# Patient Record
Sex: FEMALE | Race: WHITE | NOT HISPANIC OR LATINO | Employment: PART TIME | URBAN - METROPOLITAN AREA
[De-identification: names, ages, dates, MRNs, and addresses within clinical notes are randomized per-mention and may not be internally consistent; named-entity substitution may affect disease eponyms.]

---
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
## 2021-08-02 ENCOUNTER — HOSPITAL ENCOUNTER (EMERGENCY)
Facility: HOSPITAL | Age: 21
Discharge: HOME | End: 2021-08-02
Attending: NURSE PRACTITIONER | Admitting: NURSE PRACTITIONER
Payer: COMMERCIAL

## 2021-08-02 VITALS — OXYGEN SATURATION: 99 % | TEMPERATURE: 99.2 F | HEART RATE: 70 BPM | RESPIRATION RATE: 18 BRPM

## 2021-08-02 DIAGNOSIS — Z20.822 EXPOSURE TO COVID-19 VIRUS: Primary | ICD-10-CM

## 2021-08-02 LAB — SARS-COV-2 RNA RESP QL NAA+PROBE: NEGATIVE

## 2021-08-02 PROCEDURE — C9803 HOPD COVID-19 SPEC COLLECT: HCPCS

## 2021-08-02 PROCEDURE — G0463 HOSPITAL OUTPT CLINIC VISIT: HCPCS

## 2021-08-02 PROCEDURE — U0003 INFECTIOUS AGENT DETECTION BY NUCLEIC ACID (DNA OR RNA); SEVERE ACUTE RESPIRATORY SYNDROME CORONAVIRUS 2 (SARS-COV-2) (CORONAVIRUS DISEASE [COVID-19]), AMPLIFIED PROBE TECHNIQUE, MAKING USE OF HIGH THROUGHPUT TECHNOLOGIES AS DESCRIBED BY CMS-2020-01-R: HCPCS | Performed by: NURSE PRACTITIONER

## 2021-08-02 PROCEDURE — 99213 OFFICE O/P EST LOW 20 MIN: CPT | Performed by: NURSE PRACTITIONER

## 2021-08-02 ASSESSMENT — ENCOUNTER SYMPTOMS
DIFFICULTY URINATING: 0
FEVER: 0
SHORTNESS OF BREATH: 1
SINUS PAIN: 0
RHINORRHEA: 0
CHILLS: 0
COUGH: 1
HEADACHES: 0
NAUSEA: 0
SORE THROAT: 1
MYALGIAS: 0
FATIGUE: 1
VOMITING: 0
ARTHRALGIAS: 0
DIARRHEA: 0
ABDOMINAL PAIN: 0
SINUS PRESSURE: 0

## 2021-08-02 NOTE — ED TRIAGE NOTES
Pt presents today with request for a  covid test due to her boyfriend testing positive today. Pt states that the only sx she has is being tired and has a headache. Pt is vaccinated. Sx started earlier today. Pt has not taken any otc meds.

## 2021-08-02 NOTE — DISCHARGE INSTRUCTIONS
(Z20.822) Exposure to COVID-19 virus  (primary encounter diagnosis)  Mild viral symptoms - COVID negative after exposure to boyfriend who tested positive today.   -- Symptomatic treatments recommended.  -Discussed that antibiotics would not help symptoms of viral URI. Education provided on symptoms of secondary bacterial infection such as new fever, chills, rigors, shortness of breath, increased work of breathing, that can occur with viral URI and need for further evaluation, if they occur.   - Ensure you are staying hydrated by drinking plenty of fluids or eating foods such as popsicles, jello, pudding.  - Honey can be soothing for sore throat  - Warm salt water gurgles can help soothe sore throat  - Rest  - Humidifier can help with congestion and help keep mucus membranes such as throat and nose from drying out.  - Sleeping slightly propped up can help with congestion and postnasal drainage that can worsen cough at bedtime.  - As long as you have never been told to take Tylenol and/or Ibuprofen you can use them to manage fever and body aches per package instructions  Make sure you eat when you take ibuprofen to avoid stomach upset.  - OTC cough medications per package instructions to help with cough. Check to see if the cough/cold medication already has acetaminophen (Tylenol) in it. If it does avoid taking additional Tylenol.  - If sudden onset of new fever, worsening symptoms return for further evaluation.  - OTC nasal steroid such as Flonase can help decrease sinus inflammation to help with congestion.  - Education provided on symptoms of post-viral bacterial infections including ear infection and pneumonia. This would require re-evaluation for treatment.      RETURN TO THE ED WITH NEW OR WORSENING SYMPTOMS.      Melina Garcia CNP    Results for orders placed or performed during the hospital encounter of 08/02/21   SARS-COV2 (COVID-19) Virus RT-PCR     Status: Normal    Specimen: Nasopharyngeal; Swab    Result Value Ref Range    SARS CoV2 PCR Negative Negative    Narrative    Testing was performed using the Xpert Xpress SARS-CoV-2 Assay on the   Cepheid Gene-Xpert Instrument Systems. Additional information about   this Emergency Use Authorization (EUA) assay can be found via the Lab   Guide. This test should be ordered for the detection of SARS-CoV-2 in   individuals who meet SARS-CoV-2 clinical and/or epidemiological   criteria. Test performance is unknown in asymptomatic patients. This   test is for in vitro diagnostic use under the FDA EUA for   laboratories certified under CLIA to perform high complexity testing.   This test has not been FDA cleared or approved. A negative result   does not rule out the presence of PCR inhibitors in the specimen or   target RNA in concentration below the limit of detection for the   assay. The possibility of a false negative should be considered if   the patient's recent exposure or clinical presentation suggests   COVID-19. This test was validated by [facility]. This laboratory is certified under the Clinical Laboratory Improve  ment Amendments (CLIA) as qualified to perform high complexity testing.   Symptomatic COVID-19 Virus (Coronavirus) by PCR Nasopharyngeal     Status: Normal    Specimen: Nasopharyngeal; Swab    Narrative    The following orders were created for panel order Symptomatic COVID-19 Virus (Coronavirus) by PCR Nasopharyngeal.  Procedure                               Abnormality         Status                     ---------                               -----------         ------                     SARS-COV2 (COVID-19) Vir...[[id number]]  Normal              Final result                 Please view results for these tests on the individual orders.

## 2021-08-02 NOTE — ED PROVIDER NOTES
History     Chief Complaint   Patient presents with     Covid 19 Testing     SO tested positive     HPI  Anahi Otto is a 20 year old female who presents ambulatory for evaluation of headache, pharyngitis, fatigue, cough, dyspnea which all started today.  Denies fever, changes to taste or smell.   Her boyfriend tested positive for COVID today. She has had COVID vaccination - Moderna x 2.   She has not tried anything for symptoms.       Allergies:  No Known Allergies    Problem List:    Patient Active Problem List    Diagnosis Date Noted     Mild recurrent major depression (H) 12/28/2020     Priority: Medium     Encounter for IUD insertion 08/19/2019     Priority: Medium     Screen for STD (sexually transmitted disease) 03/19/2019     Priority: Medium     Last Assessment & Plan:   Continue yearly gonorrhea and chlamydia swab through age 26.  Discussed safe sex practices, use condoms regularly.       Vulvar pruritus 03/19/2019     Priority: Medium     Last Assessment & Plan:   Normal exam.  Vaginal panel, DNA probe done.       Episodic tension-type headache, not intractable 02/17/2017     Priority: Medium     Elevated IOP, unspecified laterality 10/29/2014     Priority: Medium     Overview:   Likely due to technical factors       Myopia 02/02/2011     Priority: Medium        Past Medical History:    Past Medical History:   Diagnosis Date     Episodic tension-type headache, not intractable 2/17/2017       Past Surgical History:    No past surgical history on file.    Family History:    Family History   Problem Relation Age of Onset     Hypertension Father      Hyperlipidemia Father      Coronary Artery Disease Maternal Grandmother      Diabetes Maternal Grandfather      Other Cancer Paternal Grandmother        Social History:  Marital Status:  Single [1]  Social History     Tobacco Use     Smoking status: Never Smoker     Smokeless tobacco: Never Used     Tobacco comment: Vape   Substance Use Topics     Alcohol  use: No     Drug use: No        Medications:    levonorgestrel (MIRENA) 20 MCG/24HR IUD  albuterol (PROAIR HFA/PROVENTIL HFA/VENTOLIN HFA) 108 (90 Base) MCG/ACT inhaler          Review of Systems   Constitutional: Positive for fatigue. Negative for chills and fever.   HENT: Positive for sore throat. Negative for congestion, ear pain, rhinorrhea, sinus pressure and sinus pain.    Respiratory: Positive for cough and shortness of breath.    Cardiovascular: Negative for chest pain.   Gastrointestinal: Negative for abdominal pain, diarrhea, nausea and vomiting.   Genitourinary: Negative for difficulty urinating.   Musculoskeletal: Negative for arthralgias and myalgias.   Skin: Negative for rash.   Neurological: Negative for headaches.       Physical Exam   Pulse: 70  Temp: 99.2  F (37.3  C)  Resp: 18  SpO2: 99 %      Physical Exam  Constitutional:       General: She is not in acute distress.     Appearance: Normal appearance. She is well-developed. She is not ill-appearing or toxic-appearing.   HENT:      Head: Normocephalic and atraumatic.      Right Ear: Tympanic membrane, ear canal and external ear normal.      Left Ear: Tympanic membrane, ear canal and external ear normal.      Nose: Nose normal.      Mouth/Throat:      Lips: Pink.      Mouth: Mucous membranes are moist.      Pharynx: Oropharynx is clear. Uvula midline.   Eyes:      General: Lids are normal. No scleral icterus.     Conjunctiva/sclera: Conjunctivae normal.   Cardiovascular:      Rate and Rhythm: Normal rate and regular rhythm.      Heart sounds: S1 normal and S2 normal. No murmur heard.   No friction rub. No gallop.    Pulmonary:      Effort: Pulmonary effort is normal.      Breath sounds: Normal breath sounds. No wheezing, rhonchi or rales.   Musculoskeletal:      Cervical back: Neck supple.      Comments: FROM upper and lower extremities   Lymphadenopathy:      Cervical: No cervical adenopathy.   Skin:     General: Skin is warm.      Coloration:  Skin is not pale.      Findings: No erythema or rash.   Neurological:      Mental Status: She is alert and oriented to person, place, and time.      Gait: Gait is intact.   Psychiatric:         Mood and Affect: Mood normal.         Behavior: Behavior is cooperative.         ED Course        Procedures         Results for orders placed or performed during the hospital encounter of 08/02/21 (from the past 24 hour(s))   Symptomatic COVID-19 Virus (Coronavirus) by PCR Nasopharyngeal    Specimen: Nasopharyngeal; Swab    Narrative    The following orders were created for panel order Symptomatic COVID-19 Virus (Coronavirus) by PCR Nasopharyngeal.  Procedure                               Abnormality         Status                     ---------                               -----------         ------                     SARS-COV2 (COVID-19) Vir...[[id number]]  Normal              Final result                 Please view results for these tests on the individual orders.   SARS-COV2 (COVID-19) Virus RT-PCR    Specimen: Nasopharyngeal; Swab   Result Value Ref Range    SARS CoV2 PCR Negative Negative    Narrative    Testing was performed using the Xpert Xpress SARS-CoV-2 Assay on the   Cepheid Gene-Xpert Instrument Systems. Additional information about   this Emergency Use Authorization (EUA) assay can be found via the Lab   Guide. This test should be ordered for the detection of SARS-CoV-2 in   individuals who meet SARS-CoV-2 clinical and/or epidemiological   criteria. Test performance is unknown in asymptomatic patients. This   test is for in vitro diagnostic use under the FDA EUA for   laboratories certified under CLIA to perform high complexity testing.   This test has not been FDA cleared or approved. A negative result   does not rule out the presence of PCR inhibitors in the specimen or   target RNA in concentration below the limit of detection for the   assay. The possibility of a false negative should be considered if    the patient's recent exposure or clinical presentation suggests   COVID-19. This test was validated by [facility] laboratory. This laboratory is certified under the Clinical Laboratory Improve  ment Amendments (CLIA) as qualified to perform high complexity testing.       Medications - No data to display    Assessments & Plan (with Medical Decision Making)     I have reviewed the nursing notes.    I have reviewed the findings, diagnosis, plan and need for follow up with the patient.  (Z20.822) Exposure to COVID-19 virus  (primary encounter diagnosis)  Mild viral symptoms - COVID negative after exposure to boyfriend who tested positive today.   -- Symptomatic treatments recommended.      RETURN TO THE ED WITH NEW OR WORSENING SYMPTOMS.      Melina Garcia CNP    Discharge Medication List as of 8/2/2021  4:50 PM          Final diagnoses:   Exposure to COVID-19 virus       8/2/2021   [facility]     Melina Garcia CNP  08/02/21 [id number]

## 2021-08-02 NOTE — Clinical Note
Anahi Otto was seen and treated in our emergency department on 8/2/2021.  She may return to work on 08/03/2021.  COVID negative. Follow work-place guidelines on vaccinated employees and COVID exposure.    Melina Garcia CNP on 8/2/2021 at 4:51 PM       If you have any questions or concerns, please don't hesitate to call.      Melina Garcia, CNP

## 2022-07-16 ENCOUNTER — TRANSFERRED RECORDS (OUTPATIENT)
Dept: HEALTH INFORMATION MANAGEMENT | Facility: CLINIC | Age: 22
End: 2022-07-16

## 2022-07-16 LAB
CREATININE (EXTERNAL): 0.72 MG/DL (ref 0.4–1)
GFR ESTIMATED (EXTERNAL): >60 ML/MIN/1.73M2
GLUCOSE (EXTERNAL): 96 MG/DL (ref 70–99)
POTASSIUM (EXTERNAL): 3.3 MEQ/L (ref 3.4–5.1)
TSH SERPL-ACNC: 1.56 UIU/ML (ref 0.4–3.99)

## 2023-05-02 ENCOUNTER — OFFICE VISIT (OUTPATIENT)
Dept: FAMILY MEDICINE | Facility: OTHER | Age: 23
End: 2023-05-02
Attending: NURSE PRACTITIONER
Payer: COMMERCIAL

## 2023-05-02 VITALS
WEIGHT: 144.8 LBS | TEMPERATURE: 98 F | RESPIRATION RATE: 16 BRPM | OXYGEN SATURATION: 98 % | BODY MASS INDEX: 24.1 KG/M2 | HEART RATE: 76 BPM | DIASTOLIC BLOOD PRESSURE: 64 MMHG | SYSTOLIC BLOOD PRESSURE: 102 MMHG

## 2023-05-02 DIAGNOSIS — F41.1 GAD (GENERALIZED ANXIETY DISORDER): ICD-10-CM

## 2023-05-02 DIAGNOSIS — Z11.3 SCREEN FOR STD (SEXUALLY TRANSMITTED DISEASE): Primary | ICD-10-CM

## 2023-05-02 DIAGNOSIS — F33.0 MDD (MAJOR DEPRESSIVE DISORDER), RECURRENT EPISODE, MILD (H): ICD-10-CM

## 2023-05-02 PROBLEM — F60.9 CLUSTER B PERSONALITY DISORDER IN ADULT (H): Status: RESOLVED | Noted: 2022-07-26 | Resolved: 2023-05-02

## 2023-05-02 PROBLEM — L29.2 VULVAR PRURITUS: Status: RESOLVED | Noted: 2019-03-19 | Resolved: 2023-05-02

## 2023-05-02 PROBLEM — F60.9 CLUSTER B PERSONALITY DISORDER IN ADULT (H): Status: ACTIVE | Noted: 2022-07-26

## 2023-05-02 LAB
ALBUMIN UR-MCNC: NEGATIVE MG/DL
APPEARANCE UR: CLEAR
BACTERIA #/AREA URNS HPF: ABNORMAL /HPF
BILIRUB UR QL STRIP: NEGATIVE
CLUE CELLS: ABNORMAL
COLOR UR AUTO: YELLOW
GLUCOSE UR STRIP-MCNC: NEGATIVE MG/DL
HGB UR QL STRIP: ABNORMAL
HOLD SPECIMEN: NORMAL
HOLD SPECIMEN: NORMAL
KETONES UR STRIP-MCNC: NEGATIVE MG/DL
LEUKOCYTE ESTERASE UR QL STRIP: ABNORMAL
NITRATE UR QL: NEGATIVE
PH UR STRIP: 6 [PH] (ref 5–7)
RBC #/AREA URNS AUTO: ABNORMAL /HPF
SP GR UR STRIP: 1.02 (ref 1–1.03)
SQUAMOUS #/AREA URNS AUTO: ABNORMAL /LPF
TRICHOMONAS, WET PREP: ABNORMAL
UROBILINOGEN UR STRIP-ACNC: 0.2 E.U./DL
WBC #/AREA URNS AUTO: ABNORMAL /HPF
WBC'S/HIGH POWER FIELD, WET PREP: ABNORMAL
YEAST, WET PREP: ABNORMAL

## 2023-05-02 PROCEDURE — 36415 COLL VENOUS BLD VENIPUNCTURE: CPT | Performed by: NURSE PRACTITIONER

## 2023-05-02 PROCEDURE — 99214 OFFICE O/P EST MOD 30 MIN: CPT | Performed by: NURSE PRACTITIONER

## 2023-05-02 PROCEDURE — 81001 URINALYSIS AUTO W/SCOPE: CPT | Performed by: NURSE PRACTITIONER

## 2023-05-02 PROCEDURE — 87591 N.GONORRHOEAE DNA AMP PROB: CPT | Performed by: NURSE PRACTITIONER

## 2023-05-02 PROCEDURE — 87389 HIV-1 AG W/HIV-1&-2 AB AG IA: CPT | Performed by: NURSE PRACTITIONER

## 2023-05-02 PROCEDURE — 87491 CHLMYD TRACH DNA AMP PROBE: CPT | Performed by: NURSE PRACTITIONER

## 2023-05-02 PROCEDURE — 87210 SMEAR WET MOUNT SALINE/INK: CPT | Performed by: NURSE PRACTITIONER

## 2023-05-02 ASSESSMENT — PATIENT HEALTH QUESTIONNAIRE - PHQ9: SUM OF ALL RESPONSES TO PHQ QUESTIONS 1-9: 4

## 2023-05-02 ASSESSMENT — PAIN SCALES - GENERAL: PAINLEVEL: SEVERE PAIN (6)

## 2023-05-02 NOTE — PATIENT INSTRUCTIONS
Assessment & Plan          Screen for STD (sexually transmitted disease)  - Chlamydia trachomatis/Neisseria gonorrhoeae by PCR; Future  - HIV Antigen Antibody Combo; Future  - VDRL (Syphilis) (OB External Result)  - Wet prep  - UA Macroscopic with reflex to Microscopic and Culture; Future  - UA Macroscopic with reflex to Microscopic and Culture  - Chlamydia trachomatis/Neisseria gonorrhoeae by PCR  - HIV Antigen Antibody Combo  - UA Microscopic with Reflex to Culture      MDD (major depressive disorder), recurrent episode, mild (H)  - Follow-up as needed      VONDA (generalized anxiety disorder)  - Follow-up as needed        Marina Jones CNP  [facility]

## 2023-05-02 NOTE — PROGRESS NOTES
Assessment & Plan          Screen for STD (sexually transmitted disease)  - Chlamydia trachomatis/Neisseria gonorrhoeae by PCR; Future  - HIV Antigen Antibody Combo; Future  - VDRL (Syphilis) (OB External Result)  - Wet prep  - UA Macroscopic with reflex to Microscopic and Culture; Future  - UA Macroscopic with reflex to Microscopic and Culture  - Chlamydia trachomatis/Neisseria gonorrhoeae by PCR  - HIV Antigen Antibody Combo  - UA Microscopic with Reflex to Culture      MDD (major depressive disorder), recurrent episode, mild (H)  - Follow-up as needed      VONDA (generalized anxiety disorder)  - Follow-up as needed        Marina Jones CNP  [facility] - [place] is a 22 year old, presenting for the following health issues:  std testing        Vaginal Symptoms  Onset/Duration: 3 weeks  Description:  Vaginal Discharge: yellow/greenish   Itching (Pruritis): No  Burning sensation:  No  Odor: YES  Accompanying Signs & Symptoms:  Urinary symptoms: YES  Abdominal pain: YES  Fever: No  History:   Sexually active: YES  New Partner: No  Possibility of Pregnancy:  No  Recent antibiotic use: No  Previous vaginitis issues: YES  Precipitating or alleviating factors: None  Therapies tried and outcome: none          Depression and Anxiety Follow-Up    How are you doing with your depression since your last visit? Improved     How are you doing with your anxiety since your last visit?  No change    Are you having other symptoms that might be associated with depression or anxiety? No    Have you had a significant life event? No     Do you have any concerns with your use of alcohol or other drugs? No        Social History     Tobacco Use     Smoking status: Never     Smokeless tobacco: Never     Tobacco comments:     Vape   Substance Use Topics     Alcohol use: No     Drug use: No               2/17/2017     2:38 PM 11/24/2020     1:00 PM 5/2/2023     2:53 PM   PHQ   PHQ-9 Total Score 11 17 4   Q9: Thoughts  of better off dead/self-harm past 2 weeks Not at all More than half the days Not at all                2/17/2017     2:38 PM   VONDA-7 SCORE   Total Score 11           Patient Active Problem List   Diagnosis     Episodic tension-type headache, not intractable     Encounter for IUD insertion     Myopia     Elevated IOP, unspecified laterality     MDD (major depressive disorder), recurrent episode, mild (H)     VONDA (generalized anxiety disorder)     No past surgical history on file.    Social History     Tobacco Use     Smoking status: Never     Smokeless tobacco: Never     Tobacco comments:     Vape   Vaping Use     Vaping status: Not on file   Substance Use Topics     Alcohol use: No     Family History   Problem Relation Age of Onset     Hypertension Father      Hyperlipidemia Father      Coronary Artery Disease Maternal Grandmother      Diabetes Maternal Grandfather      Other Cancer Paternal Grandmother                Current Outpatient Medications   Medication Sig Dispense Refill     albuterol (PROAIR HFA/PROVENTIL HFA/VENTOLIN HFA) 108 (90 Base) MCG/ACT inhaler Inhale 2 puffs into the lungs       levonorgestrel (MIRENA) 20 MCG/24HR IUD 1 Intra Uterine Device by Intrauterine route           No Known Allergies       No lab results found.         BP Readings from Last 3 Encounters:   05/02/23 102/64   11/24/20 104/62   07/26/18 104/62 (31 %, Z = -0.50 /  36 %, Z = -0.36)*     *BP percentiles are based on the 2017 AAP Clinical Practice Guideline for girls    Wt Readings from Last 3 Encounters:   05/02/23 65.7 kg (144 lb 12.8 oz)   11/24/20 60.3 kg (132 lb 14.4 oz) (62 %, Z= 0.31)*   07/26/18 59.4 kg (131 lb) (68 %, Z= 0.47)*     * Growth percentiles are based on CDC (Girls, 2-20 Years) data.                 Review of Systems   Constitutional, HEENT, cardiovascular, pulmonary, gi and gu systems are negative, except as otherwise noted.            Objective    /64 (BP Location: Left arm, Patient Position: Sitting,  Cuff Size: Adult Regular)   Pulse 76   Temp 98  F (36.7  C) (Tympanic)   Resp 16   Wt 65.7 kg (144 lb 12.8 oz)   SpO2 98%   BMI 24.10 kg/m    Body mass index is 24.1 kg/m .           Physical Exam   GENERAL: healthy, alert and no distress  NECK: no adenopathy, no asymmetry, masses, or scars and thyroid normal to palpation  RESP: lungs clear to auscultation - no rales, rhonchi or wheezes  CV: regular rate and rhythm, normal S1 S2, no S3 or S4, no murmur, click or rub, no peripheral edema and peripheral pulses strong  MS: no gross musculoskeletal defects noted, no edema  SKIN: no suspicious lesions or rashes  PSYCH: mentation appears normal, affect normal/bright

## 2023-05-03 LAB
C TRACH DNA SPEC QL PROBE+SIG AMP: NEGATIVE
N GONORRHOEA DNA SPEC QL NAA+PROBE: NEGATIVE

## 2023-05-03 NOTE — RESULT ENCOUNTER NOTE
Negative GC, normal wet prep, normal UA  Two tests pending (syphilis and HIV)    [facility]  [phone]

## 2023-05-04 LAB — HIV 1+2 AB+HIV1 P24 AG SERPL QL IA: NONREACTIVE

## 2023-07-31 NOTE — PROGRESS NOTES
Assessment & Plan         Cervical cancer screening  - A pap thin layer screen only - recommended age 21 - 24 years      Chronic cough  - albuterol (PROAIR HFA/PROVENTIL HFA/VENTOLIN HFA) 108 (90 Base) MCG/ACT inhaler; Inhale 2 puffs into the lungs every 6 hours as needed for shortness of breath, wheezing or cough      Nicotine dependence, uncomplicated, unspecified nicotine product type  - Nicotine gum discussed        Return in about 4 months (around 12/2/2023).        Marina Jones CNP  [facility] [place] is a 22 year old, presenting for the following health issues:  Gyn Exam           Pap due  No dysmenorrhea, no dyspareunia.  No concerns for STD's.  Does regular SBE.   No history of abnormal pap.        Acute Illness  Acute illness concerns: cough  Onset/Duration: 1 month  Symptoms:  Fever: No  Chills/Sweats: No  Headache (location?): yes  Sinus Pressure: No  Conjunctivitis:  No  Ear Pain: no  Rhinorrhea: No  Congestion: No  Sore Throat: No  Cough: YES-productive of clear sputum  Wheeze: YES  Decreased Appetite: No  Nausea: No  Vomiting: No  Diarrhea: No  Dysuria/Freq.: No  Dysuria or Hematuria: No  Fatigue/Achiness: No  Sick/Strep Exposure: No  Therapies tried and outcome: None        Patient Active Problem List   Diagnosis    Episodic tension-type headache, not intractable    Encounter for IUD insertion    Myopia    Elevated IOP, unspecified laterality    MDD (major depressive disorder), recurrent episode, mild (H)    VONDA (generalized anxiety disorder)     No past surgical history on file.    Social History     Tobacco Use    Smoking status: Never    Smokeless tobacco: Never    Tobacco comments:     Vape   Substance Use Topics    Alcohol use: No     Family History   Problem Relation Age of Onset    Hypertension Father     Hyperlipidemia Father     Coronary Artery Disease Maternal Grandmother     Diabetes Maternal Grandfather     Other Cancer Paternal Grandmother             Current Outpatient Medications   Medication Sig Dispense Refill    albuterol (PROAIR HFA/PROVENTIL HFA/VENTOLIN HFA) 108 (90 Base) MCG/ACT inhaler Inhale 2 puffs into the lungs every 6 hours as needed for shortness of breath, wheezing or cough 18 g 1    levonorgestrel (MIRENA) 20 MCG/24HR IUD 1 Intra Uterine Device by Intrauterine route           No Known Allergies      No lab results found.       BP Readings from Last 3 Encounters:   08/02/23 120/72   05/02/23 102/64   11/24/20 104/62    Wt Readings from Last 3 Encounters:   08/02/23 69 kg (152 lb 1.6 oz)   05/02/23 65.7 kg (144 lb 12.8 oz)   11/24/20 60.3 kg (132 lb 14.4 oz) (62 %, Z= 0.31)*     * Growth percentiles are based on [facility] (Girls, 2-20 Years) data.                   Review of Systems   Constitutional, HEENT, cardiovascular, pulmonary, gi and gu systems are negative, except as otherwise noted.          Objective    /72 (BP Location: Left arm, Patient Position: Sitting, Cuff Size: Adult Regular)   Pulse 81   Temp 97.9  F (36.6  C) (Tympanic)   Resp 18   Wt 69 kg (152 lb 1.6 oz)   SpO2 95%   BMI 25.31 kg/m    Body mass index is 25.31 kg/m .        Physical Exam   GENERAL: healthy, alert and no distress  EYES: Eyes grossly normal to inspection, PERRL and conjunctivae and sclerae normal  HENT: ear canals and TM's normal, nose and mouth without ulcers or lesions  NECK: no adenopathy, no asymmetry, masses, or scars and thyroid normal to palpation  RESP: lungs clear to auscultation - no rales, rhonchi or wheezes  CV: regular rate and rhythm, normal S1 S2, no S3 or S4, no murmur, click or rub, no peripheral edema and peripheral pulses strong   (female): normal female external genitalia, normal urethral meatus, vaginal mucosa, normal cervix/adnexa/uterus without masses or discharge  SKIN: no suspicious lesions or rashes  PSYCH: mentation appears normal, affect normal/bright        Patient is seen in conjunction with NP student.  History is  reviewed with patient and pertinent portions of the exam are repeated.  Assessment and plan is reviewed with the patient.

## 2023-08-02 ENCOUNTER — OFFICE VISIT (OUTPATIENT)
Dept: FAMILY MEDICINE | Facility: OTHER | Age: 23
End: 2023-08-02
Attending: NURSE PRACTITIONER
Payer: COMMERCIAL

## 2023-08-02 VITALS
BODY MASS INDEX: 25.31 KG/M2 | OXYGEN SATURATION: 95 % | HEART RATE: 81 BPM | TEMPERATURE: 97.9 F | SYSTOLIC BLOOD PRESSURE: 120 MMHG | DIASTOLIC BLOOD PRESSURE: 72 MMHG | WEIGHT: 152.1 LBS | RESPIRATION RATE: 18 BRPM

## 2023-08-02 DIAGNOSIS — F17.200 NICOTINE DEPENDENCE, UNCOMPLICATED, UNSPECIFIED NICOTINE PRODUCT TYPE: ICD-10-CM

## 2023-08-02 DIAGNOSIS — Z12.4 CERVICAL CANCER SCREENING: Primary | ICD-10-CM

## 2023-08-02 DIAGNOSIS — R05.3 CHRONIC COUGH: ICD-10-CM

## 2023-08-02 PROCEDURE — 99213 OFFICE O/P EST LOW 20 MIN: CPT | Mod: 25 | Performed by: NURSE PRACTITIONER

## 2023-08-02 PROCEDURE — G0124 SCREEN C/V THIN LAYER BY MD: HCPCS | Performed by: PATHOLOGY

## 2023-08-02 PROCEDURE — 90471 IMMUNIZATION ADMIN: CPT | Performed by: NURSE PRACTITIONER

## 2023-08-02 PROCEDURE — 90715 TDAP VACCINE 7 YRS/> IM: CPT | Performed by: NURSE PRACTITIONER

## 2023-08-02 PROCEDURE — G0123 SCREEN CERV/VAG THIN LAYER: HCPCS | Performed by: NURSE PRACTITIONER

## 2023-08-02 RX ORDER — ALBUTEROL SULFATE 90 UG/1
2 AEROSOL, METERED RESPIRATORY (INHALATION) EVERY 6 HOURS PRN
Qty: 18 G | Refills: 1 | Status: SHIPPED | OUTPATIENT
Start: 2023-08-02 | End: 2024-08-06

## 2023-08-02 ASSESSMENT — PAIN SCALES - GENERAL: PAINLEVEL: NO PAIN (0)

## 2023-08-02 NOTE — PATIENT INSTRUCTIONS
Assessment & Plan         Cervical cancer screening  - A pap thin layer screen only - recommended age 21 - 24 years      Chronic cough  - albuterol (PROAIR HFA/PROVENTIL HFA/VENTOLIN HFA) 108 (90 Base) MCG/ACT inhaler; Inhale 2 puffs into the lungs every 6 hours as needed for shortness of breath, wheezing or cough      Nicotine dependence, uncomplicated, unspecified nicotine product type  - Nicotine gum discussed          Return in about 4 months (around 12/2/2023).          Marina Jones CNP  [facility]

## 2023-08-11 LAB
BKR LAB AP GYN ADEQUACY: ABNORMAL
BKR LAB AP GYN INTERPRETATION: ABNORMAL
BKR LAB AP GYN OTHER FINDINGS: ABNORMAL
BKR LAB AP HPV REFLEX: NO
BKR LAB AP PREVIOUS ABNORMAL: ABNORMAL
PATH REPORT.COMMENTS IMP SPEC: ABNORMAL
PATH REPORT.COMMENTS IMP SPEC: ABNORMAL
PATH REPORT.RELEVANT HX SPEC: ABNORMAL

## 2023-08-14 DIAGNOSIS — B37.31 YEAST INFECTION OF THE VAGINA: Primary | ICD-10-CM

## 2023-08-14 RX ORDER — FLUCONAZOLE 150 MG/1
150 TABLET ORAL ONCE
Qty: 1 TABLET | Refills: 0 | Status: SHIPPED | OUTPATIENT
Start: 2023-08-14 | End: 2023-08-14

## 2023-10-07 ENCOUNTER — HEALTH MAINTENANCE LETTER (OUTPATIENT)
Age: 23
End: 2023-10-07

## 2024-04-10 ENCOUNTER — OFFICE VISIT (OUTPATIENT)
Dept: FAMILY MEDICINE | Facility: OTHER | Age: 24
End: 2024-04-10
Attending: NURSE PRACTITIONER
Payer: COMMERCIAL

## 2024-04-10 VITALS
DIASTOLIC BLOOD PRESSURE: 70 MMHG | BODY MASS INDEX: 24.18 KG/M2 | HEART RATE: 74 BPM | TEMPERATURE: 98.4 F | WEIGHT: 145.3 LBS | SYSTOLIC BLOOD PRESSURE: 114 MMHG | RESPIRATION RATE: 18 BRPM | OXYGEN SATURATION: 96 %

## 2024-04-10 DIAGNOSIS — B96.89 BACTERIAL VAGINOSIS: ICD-10-CM

## 2024-04-10 DIAGNOSIS — F33.0 MDD (MAJOR DEPRESSIVE DISORDER), RECURRENT EPISODE, MILD (H): ICD-10-CM

## 2024-04-10 DIAGNOSIS — Z20.2 POSSIBLE EXPOSURE TO STD: Primary | ICD-10-CM

## 2024-04-10 DIAGNOSIS — N30.00 ACUTE CYSTITIS WITHOUT HEMATURIA: ICD-10-CM

## 2024-04-10 DIAGNOSIS — N76.0 BACTERIAL VAGINOSIS: ICD-10-CM

## 2024-04-10 DIAGNOSIS — F41.1 GAD (GENERALIZED ANXIETY DISORDER): ICD-10-CM

## 2024-04-10 LAB
ALBUMIN UR-MCNC: 100 MG/DL
APPEARANCE UR: CLEAR
BACTERIA #/AREA URNS HPF: ABNORMAL /HPF
BILIRUB UR QL STRIP: NEGATIVE
CLUE CELLS: PRESENT
COLOR UR AUTO: YELLOW
GLUCOSE UR STRIP-MCNC: NEGATIVE MG/DL
HGB UR QL STRIP: ABNORMAL
KETONES UR STRIP-MCNC: NEGATIVE MG/DL
LEUKOCYTE ESTERASE UR QL STRIP: ABNORMAL
NITRATE UR QL: NEGATIVE
PH UR STRIP: 5.5 [PH] (ref 5–7)
RBC #/AREA URNS AUTO: ABNORMAL /HPF
SP GR UR STRIP: >=1.03 (ref 1–1.03)
SQUAMOUS #/AREA URNS AUTO: ABNORMAL /LPF
TRICHOMONAS, WET PREP: ABNORMAL
UROBILINOGEN UR STRIP-ACNC: 0.2 E.U./DL
WBC #/AREA URNS AUTO: ABNORMAL /HPF
WBC'S/HIGH POWER FIELD, WET PREP: ABNORMAL
YEAST, WET PREP: ABNORMAL

## 2024-04-10 PROCEDURE — 87086 URINE CULTURE/COLONY COUNT: CPT | Performed by: NURSE PRACTITIONER

## 2024-04-10 PROCEDURE — 87491 CHLMYD TRACH DNA AMP PROBE: CPT | Performed by: NURSE PRACTITIONER

## 2024-04-10 PROCEDURE — 87591 N.GONORRHOEAE DNA AMP PROB: CPT | Performed by: NURSE PRACTITIONER

## 2024-04-10 PROCEDURE — 81001 URINALYSIS AUTO W/SCOPE: CPT | Performed by: NURSE PRACTITIONER

## 2024-04-10 PROCEDURE — 86780 TREPONEMA PALLIDUM: CPT | Performed by: NURSE PRACTITIONER

## 2024-04-10 PROCEDURE — 87210 SMEAR WET MOUNT SALINE/INK: CPT | Performed by: NURSE PRACTITIONER

## 2024-04-10 PROCEDURE — 36415 COLL VENOUS BLD VENIPUNCTURE: CPT | Performed by: NURSE PRACTITIONER

## 2024-04-10 PROCEDURE — 87389 HIV-1 AG W/HIV-1&-2 AB AG IA: CPT | Performed by: NURSE PRACTITIONER

## 2024-04-10 PROCEDURE — 99214 OFFICE O/P EST MOD 30 MIN: CPT | Performed by: NURSE PRACTITIONER

## 2024-04-10 PROCEDURE — G2211 COMPLEX E/M VISIT ADD ON: HCPCS | Performed by: NURSE PRACTITIONER

## 2024-04-10 RX ORDER — METRONIDAZOLE 7.5 MG/G
1 GEL VAGINAL AT BEDTIME
Qty: 70 G | Refills: 0 | Status: SHIPPED | OUTPATIENT
Start: 2024-04-10 | End: 2024-04-15

## 2024-04-10 RX ORDER — SULFAMETHOXAZOLE/TRIMETHOPRIM 800-160 MG
1 TABLET ORAL 2 TIMES DAILY
Qty: 14 TABLET | Refills: 0 | Status: SHIPPED | OUTPATIENT
Start: 2024-04-10 | End: 2024-04-17

## 2024-04-10 ASSESSMENT — ANXIETY QUESTIONNAIRES
8. IF YOU CHECKED OFF ANY PROBLEMS, HOW DIFFICULT HAVE THESE MADE IT FOR YOU TO DO YOUR WORK, TAKE CARE OF THINGS AT HOME, OR GET ALONG WITH OTHER PEOPLE?: NOT DIFFICULT AT ALL
6. BECOMING EASILY ANNOYED OR IRRITABLE: NOT AT ALL
5. BEING SO RESTLESS THAT IT IS HARD TO SIT STILL: NOT AT ALL
IF YOU CHECKED OFF ANY PROBLEMS ON THIS QUESTIONNAIRE, HOW DIFFICULT HAVE THESE PROBLEMS MADE IT FOR YOU TO DO YOUR WORK, TAKE CARE OF THINGS AT HOME, OR GET ALONG WITH OTHER PEOPLE: NOT DIFFICULT AT ALL
3. WORRYING TOO MUCH ABOUT DIFFERENT THINGS: NOT AT ALL
7. FEELING AFRAID AS IF SOMETHING AWFUL MIGHT HAPPEN: SEVERAL DAYS
1. FEELING NERVOUS, ANXIOUS, OR ON EDGE: NOT AT ALL
GAD7 TOTAL SCORE: 1
4. TROUBLE RELAXING: NOT AT ALL
7. FEELING AFRAID AS IF SOMETHING AWFUL MIGHT HAPPEN: SEVERAL DAYS
2. NOT BEING ABLE TO STOP OR CONTROL WORRYING: NOT AT ALL

## 2024-04-10 ASSESSMENT — PATIENT HEALTH QUESTIONNAIRE - PHQ9
SUM OF ALL RESPONSES TO PHQ QUESTIONS 1-9: 7
SUM OF ALL RESPONSES TO PHQ QUESTIONS 1-9: 7
10. IF YOU CHECKED OFF ANY PROBLEMS, HOW DIFFICULT HAVE THESE PROBLEMS MADE IT FOR YOU TO DO YOUR WORK, TAKE CARE OF THINGS AT HOME, OR GET ALONG WITH OTHER PEOPLE: NOT DIFFICULT AT ALL

## 2024-04-10 ASSESSMENT — PAIN SCALES - GENERAL: PAINLEVEL: MODERATE PAIN (4)
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
## 2024-04-10 NOTE — PATIENT INSTRUCTIONS
Assessment & Plan         Possible exposure to STD  - GC/Chlamydia by PCR - [facility]  - UA Macroscopic with reflex to Microscopic and Culture  - HIV Antigen Antibody Combo  - Treponema Abs w Reflex to RPR and Titer  - Urine Microscopic Exam  - Urine Culture      Acute cystitis without hematuria  - sulfamethoxazole-trimethoprim (BACTRIM DS) 800-160 MG tablet; Take 1 tablet by mouth 2 times daily for 7 days      Bacterial vaginosis  - metroNIDAZOLE (METROGEL) 0.75 % vaginal gel; Place 1 applicator (5 g) vaginally at bedtime for 5 days      VONDA (generalized anxiety disorder)  - Follow-up as needed      MDD (major depressive disorder), recurrent episode, mild (H24)  - Follow-up as needed      Monitor for fever or increased symptoms        Please continue to take all medication as directed  Please notify your pharmacy or our refill line of future refills needed.  Please return sooner than your next scheduled appointment with any concerns.      When your lab results return, we will call you with abnormal findings  You can also view this information in your MyChart, if you have an account.  Please call or [employer] message us with any questions you may have.          Marina QUINONES  [phone]

## 2024-04-11 LAB
C TRACH DNA SPEC QL PROBE+SIG AMP: NEGATIVE
N GONORRHOEA DNA SPEC QL NAA+PROBE: NEGATIVE
T PALLIDUM AB SER QL: NONREACTIVE

## 2024-04-12 LAB
BACTERIA UR CULT: NORMAL
HIV 1+2 AB+HIV1 P24 AG SERPL QL IA: NONREACTIVE

## 2024-04-12 NOTE — RESULT ENCOUNTER NOTE
Urine culture indicates mixed carroll.  Continue plan of care    [facility]  [phone]

## 2024-08-06 ENCOUNTER — OFFICE VISIT (OUTPATIENT)
Dept: FAMILY MEDICINE | Facility: OTHER | Age: 24
End: 2024-08-06
Attending: NURSE PRACTITIONER
Payer: COMMERCIAL

## 2024-08-06 VITALS
OXYGEN SATURATION: 99 % | RESPIRATION RATE: 18 BRPM | HEART RATE: 70 BPM | WEIGHT: 159.2 LBS | SYSTOLIC BLOOD PRESSURE: 120 MMHG | BODY MASS INDEX: 26.49 KG/M2 | TEMPERATURE: 97.4 F | DIASTOLIC BLOOD PRESSURE: 70 MMHG

## 2024-08-06 DIAGNOSIS — N89.8 VAGINAL ITCHING: ICD-10-CM

## 2024-08-06 DIAGNOSIS — R05.3 CHRONIC COUGH: ICD-10-CM

## 2024-08-06 DIAGNOSIS — B96.89 BV (BACTERIAL VAGINOSIS): ICD-10-CM

## 2024-08-06 DIAGNOSIS — N76.0 BV (BACTERIAL VAGINOSIS): ICD-10-CM

## 2024-08-06 DIAGNOSIS — R35.0 URINARY FREQUENCY: Primary | ICD-10-CM

## 2024-08-06 DIAGNOSIS — N89.8 VAGINAL ODOR: ICD-10-CM

## 2024-08-06 LAB
ALBUMIN UR-MCNC: NEGATIVE MG/DL
APPEARANCE UR: ABNORMAL
BACTERIA #/AREA URNS HPF: ABNORMAL /HPF
BILIRUB UR QL STRIP: NEGATIVE
CLUE CELLS: PRESENT
COLOR UR AUTO: YELLOW
GLUCOSE UR STRIP-MCNC: NEGATIVE MG/DL
HGB UR QL STRIP: ABNORMAL
KETONES UR STRIP-MCNC: NEGATIVE MG/DL
LEUKOCYTE ESTERASE UR QL STRIP: NEGATIVE
NITRATE UR QL: NEGATIVE
PH UR STRIP: 6 [PH] (ref 5–7)
RBC #/AREA URNS AUTO: ABNORMAL /HPF
SP GR UR STRIP: >=1.03 (ref 1–1.03)
SQUAMOUS #/AREA URNS AUTO: ABNORMAL /LPF
TRICHOMONAS, WET PREP: ABNORMAL
UROBILINOGEN UR STRIP-ACNC: 0.2 E.U./DL
WBC #/AREA URNS AUTO: ABNORMAL /HPF
WBC'S/HIGH POWER FIELD, WET PREP: ABNORMAL
YEAST, WET PREP: ABNORMAL

## 2024-08-06 PROCEDURE — 81001 URINALYSIS AUTO W/SCOPE: CPT | Performed by: NURSE PRACTITIONER

## 2024-08-06 PROCEDURE — 99214 OFFICE O/P EST MOD 30 MIN: CPT | Performed by: NURSE PRACTITIONER

## 2024-08-06 PROCEDURE — 87210 SMEAR WET MOUNT SALINE/INK: CPT | Performed by: NURSE PRACTITIONER

## 2024-08-06 PROCEDURE — G2211 COMPLEX E/M VISIT ADD ON: HCPCS | Performed by: NURSE PRACTITIONER

## 2024-08-06 RX ORDER — METRONIDAZOLE 7.5 MG/G
1 GEL VAGINAL DAILY
Qty: 70 G | Refills: 0 | Status: SHIPPED | OUTPATIENT
Start: 2024-08-06 | End: 2024-08-11

## 2024-08-06 RX ORDER — ALBUTEROL SULFATE 90 UG/1
2 AEROSOL, METERED RESPIRATORY (INHALATION) EVERY 6 HOURS PRN
Qty: 18 G | Refills: 1 | Status: SHIPPED | OUTPATIENT
Start: 2024-08-06

## 2024-08-06 ASSESSMENT — PATIENT HEALTH QUESTIONNAIRE - PHQ9
SUM OF ALL RESPONSES TO PHQ QUESTIONS 1-9: 3
10. IF YOU CHECKED OFF ANY PROBLEMS, HOW DIFFICULT HAVE THESE PROBLEMS MADE IT FOR YOU TO DO YOUR WORK, TAKE CARE OF THINGS AT HOME, OR GET ALONG WITH OTHER PEOPLE: SOMEWHAT DIFFICULT
SUM OF ALL RESPONSES TO PHQ QUESTIONS 1-9: 3

## 2024-08-06 ASSESSMENT — PAIN SCALES - GENERAL: PAINLEVEL: NO PAIN (0)

## 2024-08-06 NOTE — PROGRESS NOTES
Assessment & Plan         Urinary frequency  - UA Macroscopic with reflex to Microscopic and Culture  - UA Microscopic with Reflex to Culture      Vaginal itching  - Wet prep      Vaginal odor  - Wet prep  - UA Macroscopic with reflex to Microscopic and Culture  - UA Microscopic with Reflex to Culture      BV (bacterial vaginosis)  - metroNIDAZOLE (METROGEL) 0.75 % vaginal gel; Place 1 applicator (5 g) vaginally daily for 5 days      Chronic cough  - albuterol (PROAIR HFA/PROVENTIL HFA/VENTOLIN HFA) 108 (90 Base) MCG/ACT inhaler; Inhale 2 puffs into the lungs every 6 hours as needed for shortness of breath, wheezing or cough        Results for orders placed or performed in visit on 08/06/24   UA Macroscopic with reflex to Microscopic and Culture     Status: Abnormal    Specimen: Urine, Midstream   Result Value Ref Range    Color Urine Yellow Colorless, Straw, Light Yellow, Yellow    Appearance Urine Slightly Cloudy (A) Clear    Glucose Urine Negative Negative mg/dL    Bilirubin Urine Negative Negative    Ketones Urine Negative Negative mg/dL    Specific Gravity Urine >=1.030 1.003 - 1.035    Blood Urine Moderate (A) Negative    pH Urine 6.0 5.0 - 7.0    Protein Albumin Urine Negative Negative mg/dL    Urobilinogen Urine 0.2 0.2, 1.0 E.U./dL    Nitrite Urine Negative Negative    Leukocyte Esterase Urine Negative Negative   UA Microscopic with Reflex to Culture     Status: Abnormal   Result Value Ref Range    Bacteria Urine Moderate (A) None Seen /HPF    RBC Urine 5-10 (A) 0-2 /HPF /HPF    WBC Urine 0-5 0-5 /HPF /HPF    Squamous Epithelials Urine Few (A) None Seen /LPF    Narrative    Urine Culture not indicated   Wet prep     Status: Abnormal    Specimen: Vagina; Swab   Result Value Ref Range    Trichomonas Absent Absent    Yeast Absent Absent    Clue Cells Present (A) Absent    WBCs/high power field 2+ (A) None            Marina Jones [facility]  [phone]           Anahi is a 23 year old, presenting for the  following health issues:  Vaginal Problem        Vaginal Symptoms  Onset/Duration: 1 month  Description:  Vaginal Discharge: yellow discharge   Itching (Pruritis): YES  Burning sensation:  No  Odor: YES  Accompanying Signs & Symptoms:  Urinary symptoms: YES- frequency  Abdominal pain: YES  Fever: No  History:   Sexually active: YES  New Partner: No  Possibility of Pregnancy:  No  Recent antibiotic use: No  Previous vaginitis issues: YES  Precipitating or alleviating factors: None  Therapies tried and outcome: none              Patient Active Problem List   Diagnosis    Episodic tension-type headache, not intractable    Encounter for IUD insertion    Myopia    Elevated IOP, unspecified laterality    MDD (major depressive disorder), recurrent episode, mild (H24)    VONDA (generalized anxiety disorder)     No past surgical history on file.    Social History     Tobacco Use    Smoking status: Never    Smokeless tobacco: Never    Tobacco comments:     Vape   Substance Use Topics    Alcohol use: No     Family History   Problem Relation Age of Onset    Hypertension Father     Hyperlipidemia Father     Coronary Artery Disease Maternal Grandmother     Diabetes Maternal Grandfather     Other Cancer Paternal Grandmother              Current Outpatient Medications   Medication Sig Dispense Refill    albuterol (PROAIR HFA/PROVENTIL HFA/VENTOLIN HFA) 108 (90 Base) MCG/ACT inhaler Inhale 2 puffs into the lungs every 6 hours as needed for shortness of breath, wheezing or cough 18 g 1    levonorgestrel (MIRENA) 20 MCG/24HR IUD 1 Intra Uterine Device by Intrauterine route         No Known Allergies      No lab results found.         BP Readings from Last 3 Encounters:   08/06/24 120/70   04/10/24 114/70   08/02/23 120/72    Wt Readings from Last 3 Encounters:   08/06/24 72.2 kg (159 lb 3.2 oz)   04/10/24 65.9 kg (145 lb 4.8 oz)   08/02/23 69 kg (152 lb 1.6 oz)                 Review of Systems  Constitutional, neuro, ENT, endocrine,  pulmonary, cardiac, gastrointestinal, genitourinary, musculoskeletal, integument and psychiatric systems are negative, except as otherwise noted.          Objective    /70 (BP Location: Left arm, Patient Position: Sitting, Cuff Size: Adult Regular)   Pulse 70   Temp 97.4  F (36.3  C) (Tympanic)   Resp 18   Wt 72.2 kg (159 lb 3.2 oz)   SpO2 99%   BMI 26.49 kg/m    Body mass index is 26.49 kg/m .          Physical Exam   GENERAL: alert and no distress  RESP: lungs clear to auscultation - no rales, rhonchi or wheezes  CV: regular rate and rhythm, normal S1 S2, no S3 or S4, no murmur, click or rub, no peripheral edema  ABDOMEN: soft, nontender, no hepatosplenomegaly, no masses and bowel sounds normal          Results for orders placed or performed in visit on 08/06/24   UA Macroscopic with reflex to Microscopic and Culture     Status: Abnormal    Specimen: Urine, Midstream   Result Value Ref Range    Color Urine Yellow Colorless, Straw, Light Yellow, Yellow    Appearance Urine Slightly Cloudy (A) Clear    Glucose Urine Negative Negative mg/dL    Bilirubin Urine Negative Negative    Ketones Urine Negative Negative mg/dL    Specific Gravity Urine >=1.030 1.003 - 1.035    Blood Urine Moderate (A) Negative    pH Urine 6.0 5.0 - 7.0    Protein Albumin Urine Negative Negative mg/dL    Urobilinogen Urine 0.2 0.2, 1.0 E.U./dL    Nitrite Urine Negative Negative    Leukocyte Esterase Urine Negative Negative   UA Microscopic with Reflex to Culture     Status: Abnormal   Result Value Ref Range    Bacteria Urine Moderate (A) None Seen /HPF    RBC Urine 5-10 (A) 0-2 /HPF /HPF    WBC Urine 0-5 0-5 /HPF /HPF    Squamous Epithelials Urine Few (A) None Seen /LPF    Narrative    Urine Culture not indicated   Wet prep     Status: Abnormal    Specimen: Vagina; Swab   Result Value Ref Range    Trichomonas Absent Absent    Yeast Absent Absent    Clue Cells Present (A) Absent    WBCs/high power field 2+ (A) None            The  longitudinal plan of care for the diagnosis(es)/condition(s) as documented were addressed during this visit. Due to the added complexity in care, I will continue to support Anahi in the subsequent management and with ongoing continuity of care.             Signed Electronically by: Marina Jones CNP

## 2024-08-06 NOTE — PATIENT INSTRUCTIONS
Assessment & Plan         Urinary frequency  - UA Macroscopic with reflex to Microscopic and Culture  - UA Microscopic with Reflex to Culture      Vaginal itching  - Wet prep      Vaginal odor  - Wet prep  - UA Macroscopic with reflex to Microscopic and Culture  - UA Microscopic with Reflex to Culture      BV (bacterial vaginosis)  - metroNIDAZOLE (METROGEL) 0.75 % vaginal gel; Place 1 applicator (5 g) vaginally daily for 5 days      Chronic cough  - albuterol (PROAIR HFA/PROVENTIL HFA/VENTOLIN HFA) 108 (90 Base) MCG/ACT inhaler; Inhale 2 puffs into the lungs every 6 hours as needed for shortness of breath, wheezing or cough        Results for orders placed or performed in visit on 08/06/24   UA Macroscopic with reflex to Microscopic and Culture     Status: Abnormal    Specimen: Urine, Midstream   Result Value Ref Range    Color Urine Yellow Colorless, Straw, Light Yellow, Yellow    Appearance Urine Slightly Cloudy (A) Clear    Glucose Urine Negative Negative mg/dL    Bilirubin Urine Negative Negative    Ketones Urine Negative Negative mg/dL    Specific Gravity Urine >=1.030 1.003 - 1.035    Blood Urine Moderate (A) Negative    pH Urine 6.0 5.0 - 7.0    Protein Albumin Urine Negative Negative mg/dL    Urobilinogen Urine 0.2 0.2, 1.0 E.U./dL    Nitrite Urine Negative Negative    Leukocyte Esterase Urine Negative Negative   UA Microscopic with Reflex to Culture     Status: Abnormal   Result Value Ref Range    Bacteria Urine Moderate (A) None Seen /HPF    RBC Urine 5-10 (A) 0-2 /HPF /HPF    WBC Urine 0-5 0-5 /HPF /HPF    Squamous Epithelials Urine Few (A) None Seen /LPF    Narrative    Urine Culture not indicated   Wet prep     Status: Abnormal    Specimen: Vagina; Swab   Result Value Ref Range    Trichomonas Absent Absent    Yeast Absent Absent    Clue Cells Present (A) Absent    WBCs/high power field 2+ (A) None            Marina Jones [facility]  [phone]

## 2024-11-30 ENCOUNTER — HEALTH MAINTENANCE LETTER (OUTPATIENT)
Age: 24
End: 2024-11-30

## 2025-03-12 ENCOUNTER — TRANSFERRED RECORDS (OUTPATIENT)
Dept: HEALTH INFORMATION MANAGEMENT | Facility: HOSPITAL | Age: 25
End: 2025-03-12

## 2025-03-12 LAB
C TRACH DNA SPEC QL PROBE+SIG AMP: NOT DETECTED
HIV 1&2 EXT: NORMAL
N GONORRHOEA DNA SPEC QL PROBE+SIG AMP: NOT DETECTED
PAP-ABSTRACT: NORMAL
SPECIMEN DESCRIP: NORMAL
SPECIMEN DESCRIPTION: NORMAL

## 2025-05-06 NOTE — PROGRESS NOTES
Assessment & Plan         Weight gain  - Ob/Gyn  Referral  - TSH with free T4 reflex; Future  - CBC with platelets and differential; Future  - Iron and iron binding capacity; Future  - Comprehensive metabolic panel; Future  - Testosterone Free and Total; Future      Excess body and facial hair  - Ob/Gyn  Referral  - TSH with free T4 reflex; Future  - CBC with platelets and differential; Future  - Iron and iron binding capacity; Future  - Comprehensive metabolic panel; Future  - Testosterone Free and Total; Future      Mood change  - Ob/Gyn  Referral  - TSH with free T4 reflex; Future  - CBC with platelets and differential; Future  - Iron and iron binding capacity; Future  - Comprehensive metabolic panel; Future  - Testosterone Free and Total; Future      Encounter for surveillance of other contraceptive  - Ob/Gyn  Referral          All chronic conditions are stable at this time  Please continue to take all medication as directed  Please notify your pharmacy or our refill line of future refills needed.  Please return sooner than your next scheduled appointment with any concerns.      When your lab results return, we will call you with abnormal findings  You can also view this information in your MyChart, if you have an account.  Please call or [employer] message us with any questions you may have.          Marina [facility]  [phone]           Anahi is a 24 year old, presenting for the following health issues:  Consult          Body changes consistent with PCOS      She has developed facial hair, body hair, weight gain, hair loss, fatigue, low energy, mood changes.  This has all gone on over the course of one year or so and has progressively gotten worse.  She is in therapy and is working on her mood concerns.  Symptoms are consistent with hyperandrogenism      Has an IUD, does not get a period  No cramping noted  No spotting        Pap UTD  - [facility] this Spring        Patient  Active Problem List   Diagnosis    Episodic tension-type headache, not intractable    Encounter for IUD insertion    Myopia    Elevated IOP, unspecified laterality    MDD (major depressive disorder), recurrent episode, mild    VONDA (generalized anxiety disorder)    History of abnormal cervical Pap smear     No past surgical history on file.    Social History     Tobacco Use    Smoking status: Never    Smokeless tobacco: Never    Tobacco comments:     Vape   Substance Use Topics    Alcohol use: No     Family History   Problem Relation Age of Onset    Hypertension Father     Hyperlipidemia Father     Coronary Artery Disease Maternal Grandmother     Diabetes Maternal Grandfather     Other Cancer Paternal Grandmother            Current Outpatient Medications   Medication Sig Dispense Refill    albuterol (PROAIR HFA/PROVENTIL HFA/VENTOLIN HFA) 108 (90 Base) MCG/ACT inhaler Inhale 2 puffs into the lungs every 6 hours as needed for shortness of breath, wheezing or cough 18 g 1    levonorgestrel (MIRENA) 20 MCG/24HR IUD 1 Intra Uterine Device by Intrauterine route           No Known Allergies      No lab results found.         BP Readings from Last 3 Encounters:   05/07/25 128/76   08/06/24 120/70   04/10/24 114/70    Wt Readings from Last 3 Encounters:   05/07/25 79.7 kg (175 lb 12.8 oz)   08/06/24 72.2 kg (159 lb 3.2 oz)   04/10/24 65.9 kg (145 lb 4.8 oz)                    Review of Systems  Constitutional, neuro, ENT, endocrine, pulmonary, cardiac, gastrointestinal, genitourinary, musculoskeletal, integument and psychiatric systems are negative, except as otherwise noted.        Objective    /76 (BP Location: Left arm, Patient Position: Sitting, Cuff Size: Adult Regular)   Pulse 86   Temp 97.7  F (36.5  C) (Tympanic)   Resp 18   Wt 79.7 kg (175 lb 12.8 oz)   SpO2 98%   BMI 29.25 kg/m    Body mass index is 29.25 kg/m .        Physical Exam   GENERAL: alert and no distress  EYES: Eyes grossly normal to  inspection, PERRL and conjunctivae and sclerae normal  NECK: no adenopathy, no asymmetry, masses, or scars  RESP: lungs clear to auscultation - no rales, rhonchi or wheezes  CV: regular rate and rhythm, normal S1 S2, no S3 or S4, no murmur, click or rub, no peripheral edema  SKIN: facial and body hair  PSYCH: tearful          The longitudinal plan of care for the diagnosis(es)/condition(s) as documented were addressed during this visit. Due to the added complexity in care, I will continue to support Anahi in the subsequent management and with ongoing continuity of care.             Signed Electronically by: Marina Jones CNP

## 2025-05-07 ENCOUNTER — OFFICE VISIT (OUTPATIENT)
Dept: FAMILY MEDICINE | Facility: OTHER | Age: 25
End: 2025-05-07
Attending: NURSE PRACTITIONER
Payer: COMMERCIAL

## 2025-05-07 VITALS
WEIGHT: 175.8 LBS | RESPIRATION RATE: 18 BRPM | DIASTOLIC BLOOD PRESSURE: 76 MMHG | BODY MASS INDEX: 29.25 KG/M2 | SYSTOLIC BLOOD PRESSURE: 128 MMHG | OXYGEN SATURATION: 98 % | TEMPERATURE: 97.7 F | HEART RATE: 86 BPM

## 2025-05-07 DIAGNOSIS — L68.9 EXCESS BODY AND FACIAL HAIR: ICD-10-CM

## 2025-05-07 DIAGNOSIS — R63.5 WEIGHT GAIN: Primary | ICD-10-CM

## 2025-05-07 DIAGNOSIS — R45.86 MOOD CHANGE: ICD-10-CM

## 2025-05-07 DIAGNOSIS — Z30.49 ENCOUNTER FOR SURVEILLANCE OF OTHER CONTRACEPTIVE: ICD-10-CM

## 2025-05-07 PROBLEM — Z87.42 HISTORY OF ABNORMAL CERVICAL PAP SMEAR: Status: ACTIVE | Noted: 2025-03-19

## 2025-05-07 LAB
ALBUMIN SERPL BCG-MCNC: 4.8 G/DL (ref 3.5–5.2)
ALP SERPL-CCNC: 78 U/L (ref 40–150)
ALT SERPL W P-5'-P-CCNC: 60 U/L (ref 0–50)
ANION GAP SERPL CALCULATED.3IONS-SCNC: 11 MMOL/L (ref 7–15)
AST SERPL W P-5'-P-CCNC: 32 U/L (ref 0–45)
BASOPHILS # BLD AUTO: 0.1 10E3/UL (ref 0–0.2)
BASOPHILS NFR BLD AUTO: 1 %
BILIRUB SERPL-MCNC: 0.3 MG/DL
BUN SERPL-MCNC: 10.5 MG/DL (ref 6–20)
CALCIUM SERPL-MCNC: 9.6 MG/DL (ref 8.8–10.4)
CHLORIDE SERPL-SCNC: 105 MMOL/L (ref 98–107)
CREAT SERPL-MCNC: 0.67 MG/DL (ref 0.51–0.95)
EGFRCR SERPLBLD CKD-EPI 2021: >90 ML/MIN/1.73M2
EOSINOPHIL # BLD AUTO: 0.2 10E3/UL (ref 0–0.7)
EOSINOPHIL NFR BLD AUTO: 2 %
ERYTHROCYTE [DISTWIDTH] IN BLOOD BY AUTOMATED COUNT: 12.1 % (ref 10–15)
GLUCOSE SERPL-MCNC: 104 MG/DL (ref 70–99)
HCO3 SERPL-SCNC: 20 MMOL/L (ref 22–29)
HCT VFR BLD AUTO: 42.8 % (ref 35–47)
HGB BLD-MCNC: 14.4 G/DL (ref 11.7–15.7)
IMM GRANULOCYTES # BLD: 0 10E3/UL
IMM GRANULOCYTES NFR BLD: 0 %
IRON BINDING CAPACITY (ROCHE): 365 UG/DL (ref 240–430)
IRON SATN MFR SERPL: 19 % (ref 15–46)
IRON SERPL-MCNC: 71 UG/DL (ref 37–145)
LYMPHOCYTES # BLD AUTO: 2.9 10E3/UL (ref 0.8–5.3)
LYMPHOCYTES NFR BLD AUTO: 37 %
MCH RBC QN AUTO: 28.8 PG (ref 26.5–33)
MCHC RBC AUTO-ENTMCNC: 33.6 G/DL (ref 31.5–36.5)
MCV RBC AUTO: 86 FL (ref 78–100)
MONOCYTES # BLD AUTO: 0.6 10E3/UL (ref 0–1.3)
MONOCYTES NFR BLD AUTO: 7 %
NEUTROPHILS # BLD AUTO: 4.2 10E3/UL (ref 1.6–8.3)
NEUTROPHILS NFR BLD AUTO: 54 %
PLATELET # BLD AUTO: 355 10E3/UL (ref 150–450)
POTASSIUM SERPL-SCNC: 4.3 MMOL/L (ref 3.4–5.3)
PROT SERPL-MCNC: 7.8 G/DL (ref 6.4–8.3)
RBC # BLD AUTO: 5 10E6/UL (ref 3.8–5.2)
SODIUM SERPL-SCNC: 136 MMOL/L (ref 135–145)
TSH SERPL DL<=0.005 MIU/L-ACNC: 1.06 UIU/ML (ref 0.3–4.2)
WBC # BLD AUTO: 7.9 10E3/UL (ref 4–11)

## 2025-05-07 ASSESSMENT — PATIENT HEALTH QUESTIONNAIRE - PHQ9
SUM OF ALL RESPONSES TO PHQ QUESTIONS 1-9: 9
SUM OF ALL RESPONSES TO PHQ QUESTIONS 1-9: 9
10. IF YOU CHECKED OFF ANY PROBLEMS, HOW DIFFICULT HAVE THESE PROBLEMS MADE IT FOR YOU TO DO YOUR WORK, TAKE CARE OF THINGS AT HOME, OR GET ALONG WITH OTHER PEOPLE: VERY DIFFICULT

## 2025-05-07 ASSESSMENT — ANXIETY QUESTIONNAIRES
7. FEELING AFRAID AS IF SOMETHING AWFUL MIGHT HAPPEN: NEARLY EVERY DAY
IF YOU CHECKED OFF ANY PROBLEMS ON THIS QUESTIONNAIRE, HOW DIFFICULT HAVE THESE PROBLEMS MADE IT FOR YOU TO DO YOUR WORK, TAKE CARE OF THINGS AT HOME, OR GET ALONG WITH OTHER PEOPLE: EXTREMELY DIFFICULT
2. NOT BEING ABLE TO STOP OR CONTROL WORRYING: MORE THAN HALF THE DAYS
6. BECOMING EASILY ANNOYED OR IRRITABLE: NEARLY EVERY DAY
1. FEELING NERVOUS, ANXIOUS, OR ON EDGE: MORE THAN HALF THE DAYS
3. WORRYING TOO MUCH ABOUT DIFFERENT THINGS: MORE THAN HALF THE DAYS
7. FEELING AFRAID AS IF SOMETHING AWFUL MIGHT HAPPEN: NEARLY EVERY DAY
GAD7 TOTAL SCORE: 12
4. TROUBLE RELAXING: NOT AT ALL
8. IF YOU CHECKED OFF ANY PROBLEMS, HOW DIFFICULT HAVE THESE MADE IT FOR YOU TO DO YOUR WORK, TAKE CARE OF THINGS AT HOME, OR GET ALONG WITH OTHER PEOPLE?: EXTREMELY DIFFICULT
5. BEING SO RESTLESS THAT IT IS HARD TO SIT STILL: NOT AT ALL
GAD7 TOTAL SCORE: 12
GAD7 TOTAL SCORE: 12

## 2025-05-07 ASSESSMENT — PAIN SCALES - GENERAL: PAINLEVEL_OUTOF10: NO PAIN (0)

## 2025-05-07 NOTE — Clinical Note
Hi Regla!  I am referring this young lady your way to discuss a few things.  First, she would like her IUD removed and would like to get on an oral contraceptive.  Second, she has all classic symptoms of PCOS - and is pretty upset about the hair growth, weight gain etc.  I ordered some baseline labs and am looking for some help diagnosing and treating this.  Thank you in advance...   Marina [facility] [phone]

## 2025-05-07 NOTE — PATIENT INSTRUCTIONS
Assessment & Plan         Weight gain  - Ob/Gyn  Referral  - TSH with free T4 reflex; Future  - CBC with platelets and differential; Future  - Iron and iron binding capacity; Future  - Comprehensive metabolic panel; Future  - Testosterone Free and Total; Future      Excess body and facial hair  - Ob/Gyn  Referral  - TSH with free T4 reflex; Future  - CBC with platelets and differential; Future  - Iron and iron binding capacity; Future  - Comprehensive metabolic panel; Future  - Testosterone Free and Total; Future      Mood change  - Ob/Gyn  Referral  - TSH with free T4 reflex; Future  - CBC with platelets and differential; Future  - Iron and iron binding capacity; Future  - Comprehensive metabolic panel; Future  - Testosterone Free and Total; Future      Encounter for surveillance of other contraceptive  - Ob/Gyn  Referral          All chronic conditions are stable at this time  Please continue to take all medication as directed  Please notify your pharmacy or our refill line of future refills needed.  Please return sooner than your next scheduled appointment with any concerns.        When your lab results return, we will call you with abnormal findings  You can also view this information in your MyChart, if you have an account.  Please call or Verious message us with any questions you may have.          Marina QUINONES  [phone]

## 2025-05-08 LAB — SHBG SERPL-SCNC: 13 NMOL/L (ref 30–135)

## 2025-05-12 ENCOUNTER — RESULTS FOLLOW-UP (OUTPATIENT)
Dept: FAMILY MEDICINE | Facility: OTHER | Age: 25
End: 2025-05-12

## 2025-05-12 LAB
TESTOST FREE SERPL-MCNC: 1.25 NG/DL
TESTOST SERPL-MCNC: 45 NG/DL (ref 8–60)